# Patient Record
Sex: MALE | Race: WHITE | NOT HISPANIC OR LATINO | Employment: FULL TIME | URBAN - METROPOLITAN AREA
[De-identification: names, ages, dates, MRNs, and addresses within clinical notes are randomized per-mention and may not be internally consistent; named-entity substitution may affect disease eponyms.]

---

## 2019-09-19 PROBLEM — M89.511 OSTEOLYSIS OF ACROMIAL END OF RIGHT CLAVICLE: Status: ACTIVE | Noted: 2019-09-19

## 2021-05-11 PROBLEM — M24.111 SNAPPING SCAPULA SYNDROME OF RIGHT SHOULDER: Status: ACTIVE | Noted: 2021-05-11

## 2022-04-20 PROBLEM — M75.41 ROTATOR CUFF IMPINGEMENT SYNDROME OF RIGHT SHOULDER: Status: ACTIVE | Noted: 2022-04-20

## 2022-10-10 PROBLEM — E29.1 MALE HYPOGONADISM: Status: ACTIVE | Noted: 2022-10-10

## 2023-07-10 PROBLEM — M75.82 ROTATOR CUFF TENDINITIS, LEFT: Status: ACTIVE | Noted: 2023-07-10

## 2023-07-10 PROBLEM — Z98.890 STATUS POST LABRAL REPAIR OF SHOULDER: Status: ACTIVE | Noted: 2023-07-10

## 2023-09-21 PROBLEM — F41.1 GENERALIZED ANXIETY DISORDER WITH PANIC ATTACKS: Status: ACTIVE | Noted: 2023-09-21

## 2023-09-21 PROBLEM — F33.1 MDD (MAJOR DEPRESSIVE DISORDER), RECURRENT EPISODE, MODERATE: Status: ACTIVE | Noted: 2023-09-21

## 2023-09-21 PROBLEM — F43.10 PTSD (POST-TRAUMATIC STRESS DISORDER): Status: ACTIVE | Noted: 2023-09-21

## 2023-09-21 PROBLEM — F41.0 GENERALIZED ANXIETY DISORDER WITH PANIC ATTACKS: Status: ACTIVE | Noted: 2023-09-21

## 2023-09-25 ENCOUNTER — SOCIAL WORK (OUTPATIENT)
Dept: ADMINISTRATIVE | Facility: OTHER | Age: 33
End: 2023-09-25

## 2023-09-25 NOTE — PROGRESS NOTES
Sw received a consult to assist with counseling. Sw called Patient ([phone]). A voice message was left requesting a call back.    Dionne Hawkins LCSW    [phone]

## 2023-09-27 ENCOUNTER — SOCIAL WORK (OUTPATIENT)
Dept: ADMINISTRATIVE | Facility: OTHER | Age: 33
End: 2023-09-27

## 2023-09-27 NOTE — PROGRESS NOTES
Sw received a consult to assist with counseling. Sw called and spoke to Patient ([phone]). He is agreeable to counseling. Henrry faxed a referral to [facility] to review.    Dinone Hawkins LCSW    [phone]

## 2023-09-28 ENCOUNTER — SOCIAL WORK (OUTPATIENT)
Dept: ADMINISTRATIVE | Facility: OTHER | Age: 33
End: 2023-09-28

## 2023-09-28 NOTE — PROGRESS NOTES
Henrry received a phone call back from [facility]. Violeta reports she did receive the referral. Once the insurance is verified, she will contact Patient to schedule an appointment. Henrry verbalized appreciation.    [facility]  [address] [place], [place]  [phone]    Dionne Hawkins LCSW    [phone]

## 2023-09-28 NOTE — PROGRESS NOTES
Sw contacted [facility] ([phone]) to follow-up on the counseling referral. A message was left requesting an update on the referral.    Dionne Hawkins LCSW    [phone]

## 2023-10-24 PROBLEM — N52.9 ERECTILE DYSFUNCTION: Status: ACTIVE | Noted: 2023-10-24

## 2023-10-30 PROBLEM — M75.112 INCOMPLETE ROTATOR CUFF TEAR OR RUPTURE OF LEFT SHOULDER, NOT SPECIFIED AS TRAUMATIC: Status: ACTIVE | Noted: 2023-10-30

## 2023-10-30 PROBLEM — M22.41 CHONDROMALACIA OF RIGHT PATELLA: Status: ACTIVE | Noted: 2023-10-30

## 2023-10-30 PROBLEM — M22.8X1 MALTRACKING OF RIGHT PATELLA: Status: ACTIVE | Noted: 2023-10-30

## 2024-02-13 ENCOUNTER — PATIENT MESSAGE (OUTPATIENT)
Dept: ADMINISTRATIVE | Facility: OTHER | Age: 34
End: 2024-02-13

## 2024-02-13 ENCOUNTER — SOCIAL WORK (OUTPATIENT)
Dept: ADMINISTRATIVE | Facility: OTHER | Age: 34
End: 2024-02-13

## 2024-02-13 NOTE — PROGRESS NOTES
Sw received a consult to assist with counseling services. Of note, Patient was referred to [facility] for counseling. Sw emailed Patient through the [facility]. He was provided their contact information and encouraged to call to schedule an appointment.    [facility]  [address]  [address]  [phone]    Dionne Hawkins LCSW    [phone]

## 2024-04-01 PROBLEM — N45.1 RIGHT EPIDIDYMITIS: Status: ACTIVE | Noted: 2024-04-01

## 2024-08-16 ENCOUNTER — TELEPHONE (OUTPATIENT)
Dept: ADMINISTRATIVE | Facility: HOSPITAL | Age: 34
End: 2024-08-16

## 2024-08-16 VITALS — DIASTOLIC BLOOD PRESSURE: 74 MMHG | SYSTOLIC BLOOD PRESSURE: 130 MMHG

## 2024-08-26 PROBLEM — R56.9 SEIZURE: Status: ACTIVE | Noted: 2024-08-26

## 2024-08-26 PROBLEM — S43.432A TEAR OF LEFT GLENOID LABRUM: Status: ACTIVE | Noted: 2024-08-26

## 2024-08-26 PROBLEM — M19.012 ARTHRITIS OF LEFT ACROMIOCLAVICULAR JOINT: Status: ACTIVE | Noted: 2024-08-26

## 2024-08-26 PROBLEM — S43.002A SHOULDER SUBLUXATION, LEFT, INITIAL ENCOUNTER: Status: ACTIVE | Noted: 2024-08-26

## 2024-10-04 ENCOUNTER — TELEPHONE (OUTPATIENT)
Dept: ADMINISTRATIVE | Facility: HOSPITAL | Age: 34
End: 2024-10-04

## 2024-10-04 VITALS — SYSTOLIC BLOOD PRESSURE: 139 MMHG | DIASTOLIC BLOOD PRESSURE: 79 MMHG
